# Patient Record
Sex: MALE | Race: WHITE | NOT HISPANIC OR LATINO | URBAN - NONMETROPOLITAN AREA
[De-identification: names, ages, dates, MRNs, and addresses within clinical notes are randomized per-mention and may not be internally consistent; named-entity substitution may affect disease eponyms.]

---

## 2017-08-02 NOTE — PATIENT DISCUSSION
1.  Opthalmic Migraine - Discussed opthalmic migraine auras and possibilty of avoiding triggers. Continue to follow with primary medical provider. 2. 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 Advise continued observation. Discussed in detail with the patient. The patient voiced understanding. 3.   Glasses change optional. 4.  Advised of RD S&S. To call if any problems. 5. Return for an appointment in 1 year for comprehensive exam. with Dr. Elen Adorno.

## 2019-10-11 NOTE — PATIENT DISCUSSION
1. Combined Types of Cataract OU: Explained how cataracts can effect vision. Recommend clinical observation. The patient was advised to contact us if any change or worsening of vision. MRx dispensed today Return for an appointment in 1 year for comprehensive exam. with Dr. Brandi Ying.  2.  Macular Hole OD - VMT - followed by [facility]

## 2020-08-26 ENCOUNTER — IMPORTED ENCOUNTER (OUTPATIENT)
Dept: URBAN - NONMETROPOLITAN AREA CLINIC 1 | Facility: CLINIC | Age: 60
End: 2020-08-26

## 2020-08-26 PROBLEM — L82.1: Noted: 2020-08-26

## 2020-08-26 PROCEDURE — 99202 OFFICE O/P NEW SF 15 MIN: CPT

## 2020-08-26 PROCEDURE — 92285 EXTERNAL OCULAR PHOTOGRAPHY: CPT

## 2020-08-26 NOTE — PATIENT DISCUSSION
seborrheic keratosis left upper and lower lids- Due to patient complaint of irritation and growth recommend excision of seborrheic keratosis at his convenience. Starting with Left Lids. seborrheic keratosis right lower lid- Due to patient complaint of irritation and growth recommend excision of seborrheic keratosis at his convenience. Photos taken today

## 2020-09-08 ENCOUNTER — IMPORTED ENCOUNTER (OUTPATIENT)
Dept: URBAN - NONMETROPOLITAN AREA CLINIC 1 | Facility: CLINIC | Age: 60
End: 2020-09-08

## 2020-09-08 NOTE — PATIENT DISCUSSION
seborrheic keratosis left upper and lower lids- Due to patient complaint of irritation and growth recommend excision of seborrheic keratosis R & B discussed patient elects to proceed. Completed without difficulty. seborrheic keratosis right lower lid- Due to patient complaint of irritation and growth recommend excision of seborrheic keratosis at his convenience.

## 2020-10-07 ENCOUNTER — IMPORTED ENCOUNTER (OUTPATIENT)
Dept: URBAN - NONMETROPOLITAN AREA CLINIC 1 | Facility: CLINIC | Age: 60
End: 2020-10-07

## 2020-10-07 PROCEDURE — 67840 REMOVE EYELID LESION: CPT

## 2020-10-07 NOTE — PATIENT DISCUSSION
seborrheic keratosis right lower lid- Due to patient complaint of irritation and growth recommend excision of seborrheic keratosis R & B discussed patient elects to proceed. Completed without difficulty. seborrheic keratosis left upper and lower lids- Doing well follwing excision of seborrheic keratosis.

## 2021-09-09 NOTE — PATIENT DISCUSSION
9/9/21 NO WORSENING, DISTANCE HAS GOOD VISION, READING IS MORE DIFFICULT. EXPLAINED THAT SCOTOMA , CENTRAL, MAY IMPROVE , BUT MAY NOT RESOLVE TOTALLY.

## 2021-09-15 NOTE — PATIENT DISCUSSION
1. Combined Types of Cataract OU: Explained how cataracts can effect vision. Recommend clinical observation. The patient was advised to contact us if any change or worsening of vision. 2. Macular Hole OD - VMT - followed by Derick Baker. Bilateral UL Dermatochalasis and Brow ptosisCan schedule bilateral functional blepharoplasty and functional direct brow lift. 4. Rhett Brow Ptosis. Pt can call for Lid Eval w/ VF and Photos if desires. Return for an appointment in 1 year for comprehensive exam. with Dr. Xiao Reagan.

## 2022-04-10 ASSESSMENT — VISUAL ACUITY
OS_CC: 20/30
OD_CC: 20/25

## 2022-09-14 NOTE — PATIENT DISCUSSION
Needs visual field testing, taped and untaped, to document that patient has met criteria for insurance reimbursement. photo need to be taken.

## 2022-09-15 NOTE — PATIENT DISCUSSION
CAN SCHEDULE BILTERAL UPPER EYELID BLEPHAROPLASTY AND A COSMETIC DIRECT BROW LIFT. SOME SCAR POST SURGERY DISCUSSED.

## 2022-11-17 NOTE — PATIENT DISCUSSION
several eroding suture removed from Right brow area without difficulty or complications. Some sutures where also trimmed.

## 2023-07-19 ENCOUNTER — ESTABLISHED PATIENT (OUTPATIENT)
Dept: RURAL CLINIC 3 | Facility: CLINIC | Age: 63
End: 2023-07-19

## 2023-07-19 DIAGNOSIS — H52.4: ICD-10-CM

## 2023-07-19 PROCEDURE — 92014 COMPRE OPH EXAM EST PT 1/>: CPT

## 2023-07-19 PROCEDURE — 92015 DETERMINE REFRACTIVE STATE: CPT

## 2023-07-19 ASSESSMENT — TONOMETRY
OS_IOP_MMHG: 16
OD_IOP_MMHG: 16

## 2023-07-19 ASSESSMENT — VISUAL ACUITY
OD_PH: 20/30
OS_SC: 20/80
OD_SC: 20/40
OS_PH: 20/30

## 2024-07-23 ENCOUNTER — COMPREHENSIVE EXAM (OUTPATIENT)
Dept: RURAL CLINIC 3 | Facility: CLINIC | Age: 64
End: 2024-07-23

## 2024-07-23 DIAGNOSIS — H52.4: ICD-10-CM

## 2024-07-23 PROCEDURE — 92014 COMPRE OPH EXAM EST PT 1/>: CPT

## 2024-07-23 PROCEDURE — 92015 DETERMINE REFRACTIVE STATE: CPT

## 2024-07-23 ASSESSMENT — TONOMETRY
OS_IOP_MMHG: 15
OD_IOP_MMHG: 15

## 2024-07-23 ASSESSMENT — VISUAL ACUITY
OU_SC: 20/100
OS_PH: 20/80
OS_SC: 20/400
OD_SC: 20/50
OD_PH: 20/40

## 2024-11-14 ENCOUNTER — CONSULTATION/EVALUATION (OUTPATIENT)
Dept: RURAL CLINIC 3 | Facility: CLINIC | Age: 64
End: 2024-11-14